# Patient Record
Sex: FEMALE | Race: WHITE | NOT HISPANIC OR LATINO | Employment: FULL TIME | URBAN - METROPOLITAN AREA
[De-identification: names, ages, dates, MRNs, and addresses within clinical notes are randomized per-mention and may not be internally consistent; named-entity substitution may affect disease eponyms.]

---

## 2018-01-18 ENCOUNTER — CONSULT (OUTPATIENT)
Dept: CARDIOLOGY | Facility: CLINIC | Age: 58
End: 2018-01-18

## 2018-01-18 VITALS
WEIGHT: 138 LBS | HEART RATE: 100 BPM | DIASTOLIC BLOOD PRESSURE: 87 MMHG | HEIGHT: 60 IN | SYSTOLIC BLOOD PRESSURE: 137 MMHG | BODY MASS INDEX: 27.09 KG/M2

## 2018-01-18 DIAGNOSIS — R06.02 SOB (SHORTNESS OF BREATH): ICD-10-CM

## 2018-01-18 DIAGNOSIS — R82.90 FOUL SMELLING URINE: Primary | ICD-10-CM

## 2018-01-18 DIAGNOSIS — R00.2 PALPITATIONS: ICD-10-CM

## 2018-01-18 DIAGNOSIS — R07.9 CHEST PAIN, UNSPECIFIED TYPE: ICD-10-CM

## 2018-01-18 PROCEDURE — 93000 ELECTROCARDIOGRAM COMPLETE: CPT | Performed by: INTERNAL MEDICINE

## 2018-01-18 PROCEDURE — 99204 OFFICE O/P NEW MOD 45 MIN: CPT | Performed by: INTERNAL MEDICINE

## 2018-01-18 RX ORDER — LISINOPRIL 10 MG/1
10 TABLET ORAL DAILY
Qty: 30 TABLET | Refills: 11 | Status: SHIPPED | OUTPATIENT
Start: 2018-01-18 | End: 2018-02-15 | Stop reason: SDUPTHER

## 2018-01-18 RX ORDER — OMEPRAZOLE 40 MG/1
20 CAPSULE, DELAYED RELEASE ORAL DAILY
COMMUNITY

## 2018-01-18 RX ORDER — AMLODIPINE BESYLATE 2.5 MG/1
2.5 TABLET ORAL DAILY
Qty: 30 TABLET | Refills: 11 | Status: SHIPPED | OUTPATIENT
Start: 2018-01-18 | End: 2018-02-15 | Stop reason: SDUPTHER

## 2018-01-18 NOTE — PROGRESS NOTES
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    HEALTHCARE PROVIDER: Roopa Lane PA-C  GASTROENTEROLOGIST: unknown, in [place]    Chief Complaint:   Chief Complaint   Patient presents with   • Chest Pain   • Shortness of Breath   • Dizziness   • Irregular Heart Beat   • Hypertension   • Rapid Heart Rate     Problem List:  1. Possible hypertensive cardiovascular disease/palpitations:  A. Remote heart monitor in her 20s for PVCs-data deficit  B. CCS class II-III chest discomfort/NYHA class II exertional dyspnea with normal EKG  2. Hypertension, probably essential  3. Former tobacco abuse in her teens  4. GERD/gastritis with EGD in fall 2017 with benign polyps  5. Foul-smelling urine - possible recurrent urinary tract infection, January 2018  6. Surgical history: Hysterectomy    No Known Allergies      Current Outpatient Prescriptions:   •  Famotidine-Ca Carb-Mag Hydrox (ACID REDUCER + ANTACID PO), Take  by mouth Daily., Disp: , Rfl:   •  omeprazole (priLOSEC) 40 MG capsule, Take 40 mg by mouth Daily., Disp: , Rfl:     History of Present Illness  This is a 57-year-old white female who presents today for elevated blood pressure, tachycardia, chest pain, and palpitations.  She states that her blood pressure has been elevated lately and has been running between 150 and 170 systolic.  She had an episode where she went to [facility] on 12/14/17 for blurred vision, chest tightness, and palpitations.  Apparently, while in the ED, she had heart rates up to 130 bpm, but her ECG was normal, and her troponins were negative.  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  She has gastritis and occasionally has right upper back pain.  In the fall of 2017, she had a workup because she thought that she was having problems with her gallbladder due to her back pain.  She had an EGD and ultrasound of her gallbladder which were negative by report; data deficit.  She has known polyps and gastritis, but all biopsies were negative; data deficit.  Sometimes she will have some weakness in her arms when her blood pressure is elevated.  She has been taking her 's lisinopril 10 mg daily, as well as using her father's Ativan to help control her heart rate and blood pressure.  When she was in her 20s, she had some PVCs and wore a heart monitor.  She denies any CVAs, TIAs, seizures, DVTs, PEs, hyperlipidemia, thyroid dysfunction, PIH, preeclampsia, gestational diabetes, or type 2 diabetes mellitus.  She feels that overall she is a very healthy person and rarely has to go to the doctor.  She has noticed that her urine has a foul smell and she had a urinary tract infection in mid-December 2017.  Last week, she went to have a urine specimen tested, and it was contaminated.  She is still experiencing the foul-smelling urine but denies any dysuria or fevers.  She smoked briefly in her teens.  She would not like to have a stress test because it gives her anxiety because she knows of a couple of people who had heart attacks while they were having stress tests.  She has never had an MI, echocardiogram, or stress test.  She has no family history of CAD.  She denies any chest pressure but sometimes feels tightness when her blood pressure and heart rate are elevated.  Associated symptoms are shortness of breath, palpitations, visual disturbances, occasional headaches, arm weakness, and occasional right upper back pain.  She is accompanied to the office today by an RN sister.    Cardiovascular Disease Risk  Factors  hyptertension    Social History     Social History   • Marital status:      Spouse name: N/A   • Number of children: 2   • Years of education: N/A     Occupational History   • Not on file.     Social History Main Topics   • Smoking status: Never Smoker   • Smokeless tobacco: Never Used   • Alcohol use No   • Drug use: No   • Sexual activity: Defer     Other Topics Concern   • Not on file     Social History Narrative   • No narrative on file       Family History   Problem Relation Age of Onset   • No Known Problems Mother    • Cancer Father    • Dementia Father    • No Known Problems Brother        Review of Systems   Constitution: Positive for malaise/fatigue.   Eyes: Positive for blurred vision.   Cardiovascular: Positive for chest pain, dyspnea on exertion, irregular heartbeat and palpitations. Negative for claudication, cyanosis, leg swelling, near-syncope, orthopnea, paroxysmal nocturnal dyspnea and syncope.   Respiratory: Positive for sleep disturbances due to breathing.    Endocrine: Positive for heat intolerance.   Hematologic/Lymphatic: Negative.    Skin: Positive for dry skin.   Musculoskeletal: Positive for back pain, neck pain and stiffness.   Gastrointestinal: Positive for heartburn. Negative for change in bowel habit.        Has gastritis   Genitourinary:        Foul-smelling urine   Neurological: Positive for dizziness and headaches.   Psychiatric/Behavioral: The patient is nervous/anxious.    Allergic/Immunologic: Negative.       Obtained and negative except as outlined in problem list and HPI.      ECG 12 Lead  Date/Time: 1/18/2018 10:37 AM  Performed by: CHAVO NIELSON  Authorized by: CHAVO NIELSON   Rhythm comments: Normal sinus rhythm with sinus arrhythmia, normal ECG, 99 bpm, QRS 82 ms,  ms,  ms                 Objective:       Vitals:    01/18/18 0924 01/18/18 0928 01/18/18 0929 01/18/18 0930   BP: 129/85 138/89 141/89 137/87   BP Location: Right arm Right arm  "Left arm Left arm   Patient Position: Sitting Standing Standing Sitting   Pulse: 104 119 115 100   Weight: 62.6 kg (138 lb)      Height: 152.4 cm (60\")      Recheck blood pressure right arm sitting was 124/84, heart rate 84  Body mass index is 26.95 kg/(m^2).     Physical Exam   Constitutional: She is oriented to person, place, and time. She appears well-developed and well-nourished.   HENT:   Mouth/Throat: Oropharynx is clear and moist and mucous membranes are normal. She does not have dentures. No oral lesions. Normal dentition. No dental abscesses, uvula swelling, lacerations or dental caries.   Eyes:   Fundoscopic exam:       The right eye shows no AV nicking, no exudate and no hemorrhage.        The left eye shows no AV nicking, no exudate and no hemorrhage.   Neck: No JVD present. Carotid bruit is not present. No thyromegaly present.   Cardiovascular: Regular rhythm, S1 normal, S2 normal and normal heart sounds.  Exam reveals no gallop, no S3 and no friction rub.    No murmur heard.  Pulses:       Dorsalis pedis pulses are 1+ on the right side, and 1+ on the left side.        Posterior tibial pulses are 1+ on the right side, and 1+ on the left side.   Pulmonary/Chest: Effort normal and breath sounds normal. She has no wheezes. She has no rhonchi. She has no rales.   Abdominal: Soft. She exhibits no mass. There is no hepatosplenomegaly. There is no tenderness. There is no guarding.   Bowel sounds audible x4   Musculoskeletal: Normal range of motion. She exhibits no edema.   Lymphadenopathy:     She has no cervical adenopathy.   Neurological: She is alert and oriented to person, place, and time.   Skin: Skin is warm, dry and intact. No rash noted.   Vitals reviewed.      Lab Review:   12/14/17:  · CMP: Sodium 141, potassium 4.1, chloride 104, carbon dioxide 27, glucose 100, BUNs 11, creatinine 0.8, protein 8, albumin 4.1, calcium 10.1, bilirubin 0.4, AST 27, ALTs 26, alkaline phosphatase 92  · Troponins less " than 0.03, less than 0.02  · TSH 1.45  · D-dimer 0.42  · CBC: WBC 7.8, RBC 4.6, hemoglobin 12.9, hematocrit 40.5, MCV 87, MCH 20, MCHC 32, RDW 13.6, platelets 147, neutrophils 59, lymphocytes 22, monocytes 7, eos 1, basos 0      Assessment:   Patient with new onset hypertension, atypical and typical chest pain, palpitations, visual disturbances, and shortness of breath; CCS class II-III chest discomfort/NYHA class II dyspnea.  We will order a CT cardiac calcium score to determine if there is any calcification in the coronary arteries suggestive of coronary artery atherosclerosis.  We will also order an echocardiogram and echo stress test to risk stratify her for focal coronary artery disease.  She will wear a ZioXT to assess her palpitations and rule out any arrhythmias, PAF, or pauses.  She has had uncontrolled hypertension, and we will add lisinopril 10 mg daily and amlodipine 2.5 mg nightly.  She has foul-smelling urine so we will also order a urinalysis.     Diagnosis Plan   1. Foul smelling urine  Urinalysis - Urine, Clean Catch   2. SOB (shortness of breath)  Adult Stress Echo W/ Cont or Stress Agent if Necessary Per Protocol    Holter Monitor - 72 Hour Up To 21 Days    CT cardiac calcium score wo dye   3. Chest pain, unspecified type  Adult Stress Echo W/ Cont or Stress Agent if Necessary Per Protocol    Holter Monitor - 72 Hour Up To 21 Days    CT cardiac calcium score wo dye   4. Palpitations  Holter Monitor - 72 Hour Up To 21 Days    CT cardiac calcium score wo dye          Plan:       1. Patient to continue current medications and close follow up with the above providers.  2. Tentative cardiology follow up in 4 weeks or patient may return sooner PRN.  3. CT cardiac calcium score  4. Echocardiogram  5. ZioXT  6. Lisinopril 10 mg daily  7. Amlodipine 2.5 mg nightly  8. UA  9. Symptom-limited echocardiographic stress test with continuous oximetry  10. 1 800 card provided      Scribed for Geovanny Odell MD  by Lilli Manzano, OSWALD. 1/18/2018  10:43 AM    I, Geovanny Odell MD, FACC, personally performed the services described in this documentation as scribed by the above named individual in my presence, and it is both accurate and complete. At 10:57 AM on 01/18/2018

## 2018-02-05 ENCOUNTER — HOSPITAL ENCOUNTER (OUTPATIENT)
Dept: CARDIOLOGY | Facility: HOSPITAL | Age: 58
Discharge: HOME | End: 2018-02-05
Attending: INTERNAL MEDICINE | Admitting: INTERNAL MEDICINE

## 2018-02-05 VITALS
BODY MASS INDEX: 27.09 KG/M2 | HEART RATE: 106 BPM | DIASTOLIC BLOOD PRESSURE: 90 MMHG | WEIGHT: 138 LBS | SYSTOLIC BLOOD PRESSURE: 138 MMHG | HEIGHT: 60 IN

## 2018-02-05 DIAGNOSIS — R06.02 SOB (SHORTNESS OF BREATH): ICD-10-CM

## 2018-02-05 DIAGNOSIS — R07.9 CHEST PAIN, UNSPECIFIED TYPE: ICD-10-CM

## 2018-02-05 DIAGNOSIS — R00.2 PALPITATIONS: ICD-10-CM

## 2018-02-05 LAB
BH CV ECHO MEAS - AO ROOT AREA (BSA CORRECTED): 1.4
BH CV ECHO MEAS - AO ROOT AREA: 3.8 CM^2
BH CV ECHO MEAS - AO ROOT DIAM: 2.2 CM
BH CV ECHO MEAS - BSA(HAYCOCK): 1.6 M^2
BH CV ECHO MEAS - BSA: 1.6 M^2
BH CV ECHO MEAS - BZI_BMI: 26.2 KILOGRAMS/M^2
BH CV ECHO MEAS - BZI_METRIC_HEIGHT: 152.4 CM
BH CV ECHO MEAS - BZI_METRIC_WEIGHT: 60.8 KG
BH CV ECHO MEAS - CONTRAST EF (2CH): 65 ML/M^2
BH CV ECHO MEAS - CONTRAST EF 4CH: 77.4 ML/M^2
BH CV ECHO MEAS - EDV(CUBED): 40.4 ML
BH CV ECHO MEAS - EDV(MOD-SP2): 20 ML
BH CV ECHO MEAS - EDV(MOD-SP4): 31 ML
BH CV ECHO MEAS - EDV(TEICH): 48.5 ML
BH CV ECHO MEAS - EF(CUBED): 79.6 %
BH CV ECHO MEAS - EF(MOD-SP2): 65 %
BH CV ECHO MEAS - EF(MOD-SP4): 77.4 %
BH CV ECHO MEAS - EF(TEICH): 73 %
BH CV ECHO MEAS - ESV(CUBED): 8.2 ML
BH CV ECHO MEAS - ESV(MOD-SP2): 7 ML
BH CV ECHO MEAS - ESV(MOD-SP4): 7 ML
BH CV ECHO MEAS - ESV(TEICH): 13.1 ML
BH CV ECHO MEAS - FS: 41.1 %
BH CV ECHO MEAS - IVS/LVPW: 0.92
BH CV ECHO MEAS - IVSD: 0.9 CM
BH CV ECHO MEAS - LA DIMENSION: 2.9 CM
BH CV ECHO MEAS - LA/AO: 1.3
BH CV ECHO MEAS - LAT PEAK E' VEL: 11.3 CM/SEC
BH CV ECHO MEAS - LV DIASTOLIC VOL/BSA (35-75): 19.7 ML/M^2
BH CV ECHO MEAS - LV MASS(C)D: 88.5 GRAMS
BH CV ECHO MEAS - LV MASS(C)DI: 56.2 GRAMS/M^2
BH CV ECHO MEAS - LV SYSTOLIC VOL/BSA (12-30): 4.4 ML/M^2
BH CV ECHO MEAS - LVIDD: 3.4 CM
BH CV ECHO MEAS - LVIDS: 2 CM
BH CV ECHO MEAS - LVLD AP2: 5.8 CM
BH CV ECHO MEAS - LVLD AP4: 5.9 CM
BH CV ECHO MEAS - LVLS AP2: 4.6 CM
BH CV ECHO MEAS - LVLS AP4: 4.5 CM
BH CV ECHO MEAS - LVPWD: 1 CM
BH CV ECHO MEAS - MED PEAK E' VEL: 8.1 CM/SEC
BH CV ECHO MEAS - MV A MAX VEL: 109.1 CM/SEC
BH CV ECHO MEAS - MV E MAX VEL: 73.5 CM/SEC
BH CV ECHO MEAS - MV E/A: 0.67
BH CV ECHO MEAS - PA ACC SLOPE: 806 CM/SEC^2
BH CV ECHO MEAS - PA ACC TIME: 0.13 SEC
BH CV ECHO MEAS - PA PR(ACCEL): 22 MMHG
BH CV ECHO MEAS - SI(CUBED): 20.4 ML/M^2
BH CV ECHO MEAS - SI(MOD-SP2): 8.3 ML/M^2
BH CV ECHO MEAS - SI(MOD-SP4): 15.2 ML/M^2
BH CV ECHO MEAS - SI(TEICH): 22.5 ML/M^2
BH CV ECHO MEAS - SV(CUBED): 32.1 ML
BH CV ECHO MEAS - SV(MOD-SP2): 13 ML
BH CV ECHO MEAS - SV(MOD-SP4): 24 ML
BH CV ECHO MEAS - SV(TEICH): 35.4 ML
BH CV ECHO MEAS - TAPSE (>1.6): 1.5 CM2
BH CV STRESS BP STAGE 1: NORMAL
BH CV STRESS BP STAGE 2: NORMAL
BH CV STRESS DURATION MIN STAGE 1: 3
BH CV STRESS DURATION MIN STAGE 2: 3
BH CV STRESS DURATION MIN STAGE 3: 1
BH CV STRESS DURATION SEC STAGE 1: 0
BH CV STRESS DURATION SEC STAGE 2: 0
BH CV STRESS DURATION SEC STAGE 3: 10
BH CV STRESS ECHO POST STRESS EJECTION FRACTION EF: 75 %
BH CV STRESS GRADE STAGE 1: 10
BH CV STRESS GRADE STAGE 2: 12
BH CV STRESS GRADE STAGE 3: 14
BH CV STRESS HR STAGE 1: 160
BH CV STRESS HR STAGE 2: 181
BH CV STRESS HR STAGE 3: 187
BH CV STRESS METS STAGE 1: 5
BH CV STRESS METS STAGE 2: 7.5
BH CV STRESS METS STAGE 3: 10
BH CV STRESS O2 STAGE 1: 99
BH CV STRESS O2 STAGE 2: 99
BH CV STRESS PROTOCOL 1: NORMAL
BH CV STRESS RECOVERY BP: NORMAL MMHG
BH CV STRESS RECOVERY HR: 125 BPM
BH CV STRESS SPEED STAGE 1: 1.7
BH CV STRESS SPEED STAGE 2: 2.5
BH CV STRESS SPEED STAGE 3: 3.4
BH CV STRESS STAGE 1: 1
BH CV STRESS STAGE 2: 2
BH CV STRESS STAGE 3: 3
BH CV VAS BP RIGHT ARM: NORMAL MMHG
BH CV XLRA - RV BASE: 2.5 CM
BH CV XLRA - RV LENGTH: 5.6 CM
BH CV XLRA - RV MID: 2 CM
BH CV XLRA - TDI S': 16.3 CM/SEC
LV EF 2D ECHO EST: 68 %
MAXIMAL PREDICTED HEART RATE: 162 BPM
PERCENT MAX PREDICTED HR: 115.43 %
STRESS BASELINE BP: NORMAL MMHG
STRESS BASELINE HR: 88 BPM
STRESS PERCENT HR: 136 %
STRESS POST ESTIMATED WORKLOAD: 10.1 METS
STRESS POST EXERCISE DUR MIN: 7 MIN
STRESS POST EXERCISE DUR SEC: 10 SEC
STRESS POST PEAK BP: NORMAL MMHG
STRESS POST PEAK HR: 187 BPM
STRESS TARGET HR: 138 BPM

## 2018-02-05 PROCEDURE — 93350 STRESS TTE ONLY: CPT | Performed by: INTERNAL MEDICINE

## 2018-02-05 PROCEDURE — 25010000002 SULFUR HEXAFLUORIDE MICROSPH 60.7-25 MG RECONSTITUTED SUSPENSION: Performed by: INTERNAL MEDICINE

## 2018-02-05 PROCEDURE — 93325 DOPPLER ECHO COLOR FLOW MAPG: CPT

## 2018-02-05 PROCEDURE — 93320 DOPPLER ECHO COMPLETE: CPT | Performed by: INTERNAL MEDICINE

## 2018-02-05 PROCEDURE — 93018 CV STRESS TEST I&R ONLY: CPT | Performed by: INTERNAL MEDICINE

## 2018-02-05 PROCEDURE — 93352 ADMIN ECG CONTRAST AGENT: CPT | Performed by: INTERNAL MEDICINE

## 2018-02-05 PROCEDURE — 93017 CV STRESS TEST TRACING ONLY: CPT

## 2018-02-05 PROCEDURE — 93350 STRESS TTE ONLY: CPT

## 2018-02-05 PROCEDURE — 93325 DOPPLER ECHO COLOR FLOW MAPG: CPT | Performed by: INTERNAL MEDICINE

## 2018-02-05 PROCEDURE — 93320 DOPPLER ECHO COMPLETE: CPT

## 2018-02-05 RX ADMIN — SULFUR HEXAFLUORIDE 2 ML: KIT at 09:00

## 2018-02-15 ENCOUNTER — OFFICE VISIT (OUTPATIENT)
Dept: CARDIOLOGY | Facility: CLINIC | Age: 58
End: 2018-02-15

## 2018-02-15 VITALS
BODY MASS INDEX: 27.29 KG/M2 | SYSTOLIC BLOOD PRESSURE: 121 MMHG | DIASTOLIC BLOOD PRESSURE: 94 MMHG | HEART RATE: 98 BPM | WEIGHT: 139 LBS | HEIGHT: 60 IN

## 2018-02-15 DIAGNOSIS — I10 ESSENTIAL HYPERTENSION: ICD-10-CM

## 2018-02-15 DIAGNOSIS — K21.9 GASTROESOPHAGEAL REFLUX DISEASE WITHOUT ESOPHAGITIS: ICD-10-CM

## 2018-02-15 DIAGNOSIS — R00.2 INTERMITTENT PALPITATIONS: ICD-10-CM

## 2018-02-15 DIAGNOSIS — I11.9 HYPERTENSIVE HEART DISEASE WITHOUT HEART FAILURE: Primary | ICD-10-CM

## 2018-02-15 PROCEDURE — 99214 OFFICE O/P EST MOD 30 MIN: CPT | Performed by: INTERNAL MEDICINE

## 2018-02-15 RX ORDER — LISINOPRIL 10 MG/1
10 TABLET ORAL DAILY
Qty: 30 TABLET | Refills: 11 | Status: SHIPPED | OUTPATIENT
Start: 2018-02-15

## 2018-02-15 RX ORDER — AMLODIPINE BESYLATE 2.5 MG/1
2.5 TABLET ORAL DAILY
Qty: 30 TABLET | Refills: 11 | Status: SHIPPED | OUTPATIENT
Start: 2018-02-15

## 2018-02-15 NOTE — PROGRESS NOTES
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     HEALTHCARE PROVIDER: Roopa Lane PA-C  GASTROENTEROLOGIST: unknown, in [place].    Chief Complaint:   Chief Complaint   Patient presents with   • Shortness of Breath   • Dizziness   • Hypertension     Problem List:  1. Possible hypertensive cardiovascular disease/palpitations:  A. Remote heart monitor in her 20s for PVCs-data deficit  B. CCS class II-III chest discomfort/NYHA class II exertional dyspnea with normal EKG  C. Echo GXT 1/18/18: Patient had complaints of mild chest tightness in the second stage but did not intensify with continued exercise, exercised for 7 minutes and 5 seconds, DYAN equals 0, THR of 137 bpm achieved upon standing on treadmill then heart rate decreased to 125 bpm, then started to walk and achieved again at 2 minutes and 50 seconds.  No significant ST or T wave changes, no ectopy and no oximetry readings.  LVEF 0.68, LV diastolic dysfunction grade 1 consistent with impaired relaxation, no pericardial effusion, no pulmonary hypertension, normal RV cavity size, wall thickness, systolic function and septal motion noted, no structural valvular abnormality demonstrated.  Low probability for significant focal obstructive coronary artery disease following exercise to 115% predicted maximum heart rate and 100% predicted excess capacity with preserved normal LV function  D. Residual class I symptoms with ZIO/XT in progress and cardiac CT calcium score deferred due to lack of insurance coverage, February 2018  2. Hypertension, probably essential  3. Former tobacco abuse in her teens  4. GERD/gastritis with EGD in fall 2017 with benign polyps  5. Foul-smelling urine - possible recurrent urinary tract infection, January 2018  6. Surgical history: Hysterectomy    No Known Allergies      Current Outpatient Prescriptions:  "  •  amLODIPine (NORVASC) 2.5 MG tablet, Take 1 tablet by mouth Daily., Disp: 30 tablet, Rfl: 11  •  lisinopril (PRINIVIL,ZESTRIL) 10 MG tablet, Take 1 tablet by mouth Daily., Disp: 30 tablet, Rfl: 11  •  Multiple Vitamin (MULTI VITAMIN PO), Take  by mouth Daily., Disp: , Rfl:   •  omeprazole (priLOSEC) 40 MG capsule, Take 20 mg by mouth Daily., Disp: , Rfl:     HISTORY OF PRESENT ILLNESS:  Patient is here for a 4 week follow-up.  Last visit she was having some atypical chest pain and new onset hypertension.  We requested a CT cardiac calcium score, but this was not performed because her insurance did not cover it.  She had an echo GXT which was acceptable.  She is currently wearing her ZioXT monitor, and she has only noticed two times when she was having tachycardia.  She states that one time, she was just sitting down doing her work, and the other time it woke her up from sleep.  She had a Coke the day before.  She has tried to limit her caffeine consumption, as she notices that this exacerbates her tachycardia.  She has not had any chest pressure or shortness of breath.  She has not had any dizziness or presyncope.  She needs refills on her lisinopril and her amlodipine.  She is asymptomatic and active and wishes to restart her gym exercise program.  Patient otherwise denies chest pain, shortness of breath, PND, edema, palpitations, syncope or presyncope at this time.      Review of Systems   Cardiovascular: Positive for dyspnea on exertion, orthopnea and palpitations.      Obtained and otherwise negative except as outlined in problem list and HPI.    Procedures       Objective:       Vitals:    02/15/18 1339 02/15/18 1342   BP: 117/82 121/94   BP Location: Left arm Left arm   Patient Position: Sitting Standing   Pulse: 88 98   Weight: 63 kg (139 lb)    Height: 152.4 cm (60\")      Body mass index is 27.15 kg/(m^2).  Last weight January 2018 was 138 pounds    Physical Exam   Constitutional: She is oriented to " person, place, and time. She appears well-developed and well-nourished.   Neck: No JVD present. Carotid bruit is not present. No thyromegaly present.   Cardiovascular: Regular rhythm, S1 normal, S2 normal and normal heart sounds.  Exam reveals no gallop, no S3 and no friction rub.    No murmur heard.  Pulses:       Carotid pulses are 2+ on the right side, and 2+ on the left side.       Radial pulses are 2+ on the right side, and 2+ on the left side.        Femoral pulses are 2+ on the right side, and 2+ on the left side.       Popliteal pulses are 2+ on the right side, and 2+ on the left side.        Dorsalis pedis pulses are 2+ on the right side, and 2+ on the left side.        Posterior tibial pulses are 2+ on the right side, and 2+ on the left side.   Pulmonary/Chest: Effort normal and breath sounds normal. She has no wheezes. She has no rhonchi. She has no rales.   Abdominal: Soft. She exhibits no mass. There is no hepatosplenomegaly. There is no tenderness. There is no guarding.   Bowel sounds audible x4   Musculoskeletal: Normal range of motion. She exhibits no edema.   Lymphadenopathy:     She has no cervical adenopathy.   Neurological: She is alert and oriented to person, place, and time.   Skin: Skin is warm, dry and intact. No rash noted.   Vitals reviewed.        Lab Review:   12/14/17: (reviewed in office today)  · CMP: Sodium 141, potassium 4.1, chloride 104, carbon dioxide 27, glucose 100, BUNs 11, creatinine 0.8, protein 8, albumin 4.1, calcium 10.1, bilirubin 0.4, AST 27, ALTs 26, alkaline phosphatase 92  · Troponins less than 0.03, less than 0.02  · TSH 1.45  · D-dimer 0.42  · CBC: WBC 7.8, RBC 4.6, hemoglobin 12.9, hematocrit 40.5, MCV 87, MCH 20, MCHC 32, RDW 13.6, platelets 147, neutrophils 59, lymphocytes 22, monocytes 7, eos 1, basos 0         Assessment:   Overall continued acceptable course with no interim cardiopulmonary complaints with acceptable functional status. We will defer additional  diagnostic or therapeutic intervention from a cardiac perspective at this time.  Her echo GXT last month was acceptable. We will review her ZioXT when the results are available.  She has had only 2 episodes of palpitations since wearing the monitor.     Diagnosis Plan   1. Hypertensive heart disease without heart failure  Stable with acceptable echo GXT January 2018   2. Essential hypertension  Controlled   3. Gastroesophageal reflux disease without esophagitis  Stable   4.      Intermittent palpitations                                            Review ZioXT results when available       Plan:         1. Patient to continue current medications and close follow up with the above providers.  2. Tentative cardiology follow up in June-July 2018 or patient may return sooner PRN.  3. Refill amlodipine and lisinopril  4. Review ZioXT when results available  5. 1-800 card provided    Scribed for Geovanny Odell MD by OSWALD Dow. 2/15/2018  1:44 PM    I, Geovanny Odell MD, [facility], personally performed the services described in this documentation as scribed by the above named individual in my presence, and it is both accurate and complete. At 2:16 PM on 02/15/2018

## 2019-03-04 RX ORDER — AMLODIPINE BESYLATE 2.5 MG/1
TABLET ORAL
Qty: 30 TABLET | Refills: 11 | OUTPATIENT
Start: 2019-03-04

## 2019-03-18 RX ORDER — LISINOPRIL 10 MG/1
TABLET ORAL
Qty: 30 TABLET | Refills: 11 | OUTPATIENT
Start: 2019-03-18

## 2019-03-21 RX ORDER — AMLODIPINE BESYLATE 2.5 MG/1
TABLET ORAL
Qty: 30 TABLET | Refills: 11 | OUTPATIENT
Start: 2019-03-21

## 2019-03-21 RX ORDER — LISINOPRIL 10 MG/1
TABLET ORAL
Qty: 30 TABLET | Refills: 11 | OUTPATIENT
Start: 2019-03-21

## 2022-05-04 ENCOUNTER — TELEPHONE (OUTPATIENT)
Dept: FAMILY MEDICINE CLINIC | Facility: CLINIC | Age: 62
End: 2022-05-04

## 2022-05-04 DIAGNOSIS — I10 ESSENTIAL HYPERTENSION: Primary | ICD-10-CM

## 2022-05-04 DIAGNOSIS — E53.8 VITAMIN B12 DEFICIENCY: ICD-10-CM

## 2022-05-04 DIAGNOSIS — Z13.220 LIPID SCREENING: ICD-10-CM

## 2022-05-04 NOTE — TELEPHONE ENCOUNTER
Patient would like to come in to have labs drawn and would like to have and order placed .    The patient can be reached at [phone] if they have any questions or concerns.    Please advise.

## 2022-05-17 ENCOUNTER — LAB (OUTPATIENT)
Dept: FAMILY MEDICINE CLINIC | Facility: CLINIC | Age: 62
End: 2022-05-17

## 2022-05-17 DIAGNOSIS — Z13.220 LIPID SCREENING: ICD-10-CM

## 2022-05-17 DIAGNOSIS — I10 ESSENTIAL HYPERTENSION: ICD-10-CM

## 2022-05-17 DIAGNOSIS — E53.8 VITAMIN B12 DEFICIENCY: ICD-10-CM

## 2022-05-17 PROCEDURE — 36415 COLL VENOUS BLD VENIPUNCTURE: CPT | Performed by: PHYSICIAN ASSISTANT

## 2022-05-18 LAB
ALBUMIN SERPL-MCNC: 4.2 G/DL (ref 3.8–4.8)
ALBUMIN/GLOB SERPL: 1.4 {RATIO} (ref 1.2–2.2)
ALP SERPL-CCNC: 99 IU/L (ref 44–121)
ALT SERPL-CCNC: 14 IU/L (ref 0–32)
AST SERPL-CCNC: 19 IU/L (ref 0–40)
BASOPHILS # BLD AUTO: 0 X10E3/UL (ref 0–0.2)
BASOPHILS NFR BLD AUTO: 1 %
BILIRUB SERPL-MCNC: 0.2 MG/DL (ref 0–1.2)
BUN SERPL-MCNC: 14 MG/DL (ref 8–27)
BUN/CREAT SERPL: 16 (ref 12–28)
CALCIUM SERPL-MCNC: 9.6 MG/DL (ref 8.7–10.3)
CHLORIDE SERPL-SCNC: 105 MMOL/L (ref 96–106)
CHOLEST SERPL-MCNC: 243 MG/DL (ref 100–199)
CO2 SERPL-SCNC: 19 MMOL/L (ref 20–29)
CREAT SERPL-MCNC: 0.85 MG/DL (ref 0.57–1)
EGFRCR SERPLBLD CKD-EPI 2021: 77 ML/MIN/1.73
EOSINOPHIL # BLD AUTO: 0.1 X10E3/UL (ref 0–0.4)
EOSINOPHIL NFR BLD AUTO: 2 %
ERYTHROCYTE [DISTWIDTH] IN BLOOD BY AUTOMATED COUNT: 14 % (ref 11.7–15.4)
GLOBULIN SER CALC-MCNC: 3 G/DL (ref 1.5–4.5)
GLUCOSE SERPL-MCNC: 93 MG/DL (ref 65–99)
HCT VFR BLD AUTO: 38.9 % (ref 34–46.6)
HDLC SERPL-MCNC: 66 MG/DL
HGB BLD-MCNC: 12.4 G/DL (ref 11.1–15.9)
IMM GRANULOCYTES # BLD AUTO: 0 X10E3/UL (ref 0–0.1)
IMM GRANULOCYTES NFR BLD AUTO: 0 %
LDLC SERPL CALC-MCNC: 156 MG/DL (ref 0–99)
LYMPHOCYTES # BLD AUTO: 2.3 X10E3/UL (ref 0.7–3.1)
LYMPHOCYTES NFR BLD AUTO: 34 %
MCH RBC QN AUTO: 26.2 PG (ref 26.6–33)
MCHC RBC AUTO-ENTMCNC: 31.9 G/DL (ref 31.5–35.7)
MCV RBC AUTO: 82 FL (ref 79–97)
MONOCYTES # BLD AUTO: 0.6 X10E3/UL (ref 0.1–0.9)
MONOCYTES NFR BLD AUTO: 9 %
NEUTROPHILS # BLD AUTO: 3.6 X10E3/UL (ref 1.4–7)
NEUTROPHILS NFR BLD AUTO: 54 %
PLATELET # BLD AUTO: 441 X10E3/UL (ref 150–450)
POTASSIUM SERPL-SCNC: 5.2 MMOL/L (ref 3.5–5.2)
PROT SERPL-MCNC: 7.2 G/DL (ref 6–8.5)
RBC # BLD AUTO: 4.73 X10E6/UL (ref 3.77–5.28)
SODIUM SERPL-SCNC: 142 MMOL/L (ref 134–144)
TRIGL SERPL-MCNC: 118 MG/DL (ref 0–149)
TSH SERPL DL<=0.005 MIU/L-ACNC: 0.46 UIU/ML (ref 0.45–4.5)
VIT B12 SERPL-MCNC: 452 PG/ML (ref 232–1245)
VLDLC SERPL CALC-MCNC: 21 MG/DL (ref 5–40)
WBC # BLD AUTO: 6.7 X10E3/UL (ref 3.4–10.8)

## 2022-06-21 RX ORDER — AMLODIPINE BESYLATE 5 MG/1
TABLET ORAL
Qty: 30 TABLET | Refills: 0 | Status: SHIPPED | OUTPATIENT
Start: 2022-06-21

## 2024-09-23 ENCOUNTER — OFFICE VISIT (OUTPATIENT)
Dept: FAMILY MEDICINE CLINIC | Facility: CLINIC | Age: 64
End: 2024-09-23
Payer: MEDICARE

## 2024-09-23 VITALS
WEIGHT: 135 LBS | HEART RATE: 85 BPM | DIASTOLIC BLOOD PRESSURE: 70 MMHG | OXYGEN SATURATION: 98 % | BODY MASS INDEX: 26.5 KG/M2 | HEIGHT: 60 IN | SYSTOLIC BLOOD PRESSURE: 110 MMHG

## 2024-09-23 DIAGNOSIS — Z11.59 ENCOUNTER FOR HEPATITIS C SCREENING TEST FOR LOW RISK PATIENT: ICD-10-CM

## 2024-09-23 DIAGNOSIS — I10 ESSENTIAL HYPERTENSION: ICD-10-CM

## 2024-09-23 DIAGNOSIS — G89.29 CHRONIC RIGHT-SIDED THORACIC BACK PAIN: ICD-10-CM

## 2024-09-23 DIAGNOSIS — Z13.1 DIABETES MELLITUS SCREENING: ICD-10-CM

## 2024-09-23 DIAGNOSIS — M54.6 CHRONIC RIGHT-SIDED THORACIC BACK PAIN: ICD-10-CM

## 2024-09-23 DIAGNOSIS — M53.3 COCCYX PAIN: ICD-10-CM

## 2024-09-23 DIAGNOSIS — Z00.00 WELCOME TO MEDICARE PREVENTIVE VISIT: Primary | ICD-10-CM

## 2024-09-23 DIAGNOSIS — M54.2 CERVICALGIA: ICD-10-CM

## 2024-09-23 PROCEDURE — 3078F DIAST BP <80 MM HG: CPT | Performed by: PHYSICIAN ASSISTANT

## 2024-09-23 PROCEDURE — 3074F SYST BP LT 130 MM HG: CPT | Performed by: PHYSICIAN ASSISTANT

## 2024-09-23 PROCEDURE — G0402 INITIAL PREVENTIVE EXAM: HCPCS | Performed by: PHYSICIAN ASSISTANT

## 2024-09-23 RX ORDER — LISINOPRIL 10 MG/1
10 TABLET ORAL DAILY
Qty: 90 TABLET | Refills: 1 | Status: SHIPPED | OUTPATIENT
Start: 2024-09-23

## 2024-09-23 RX ORDER — OMEPRAZOLE 40 MG/1
20 CAPSULE, DELAYED RELEASE ORAL DAILY
Status: CANCELLED | OUTPATIENT
Start: 2024-09-23

## 2024-09-23 RX ORDER — AMLODIPINE BESYLATE 5 MG/1
TABLET ORAL
Qty: 90 TABLET | Refills: 1 | Status: SHIPPED | OUTPATIENT
Start: 2024-09-23

## 2024-09-24 DIAGNOSIS — R79.89 LOW TSH LEVEL: Primary | ICD-10-CM

## 2024-09-24 LAB
ALBUMIN SERPL-MCNC: 4.3 G/DL (ref 3.9–4.9)
ALP SERPL-CCNC: 103 IU/L (ref 44–121)
ALT SERPL-CCNC: 12 IU/L (ref 0–32)
AST SERPL-CCNC: 22 IU/L (ref 0–40)
BASOPHILS # BLD AUTO: 0 X10E3/UL (ref 0–0.2)
BASOPHILS NFR BLD AUTO: 0 %
BILIRUB SERPL-MCNC: 0.3 MG/DL (ref 0–1.2)
BUN SERPL-MCNC: 15 MG/DL (ref 8–27)
BUN/CREAT SERPL: 15 (ref 12–28)
CALCIUM SERPL-MCNC: 9.9 MG/DL (ref 8.7–10.3)
CHLORIDE SERPL-SCNC: 103 MMOL/L (ref 96–106)
CHOLEST SERPL-MCNC: 235 MG/DL (ref 100–199)
CO2 SERPL-SCNC: 22 MMOL/L (ref 20–29)
CREAT SERPL-MCNC: 1.01 MG/DL (ref 0.57–1)
EGFRCR SERPLBLD CKD-EPI 2021: 62 ML/MIN/1.73
EOSINOPHIL # BLD AUTO: 0.1 X10E3/UL (ref 0–0.4)
EOSINOPHIL NFR BLD AUTO: 2 %
ERYTHROCYTE [DISTWIDTH] IN BLOOD BY AUTOMATED COUNT: 13.3 % (ref 11.7–15.4)
GLOBULIN SER CALC-MCNC: 2.6 G/DL (ref 1.5–4.5)
GLUCOSE SERPL-MCNC: 88 MG/DL (ref 70–99)
HBA1C MFR BLD: 5.8 % (ref 4.8–5.6)
HCT VFR BLD AUTO: 41 % (ref 34–46.6)
HCV IGG SERPL QL IA: NON REACTIVE
HDLC SERPL-MCNC: 55 MG/DL
HGB BLD-MCNC: 13.3 G/DL (ref 11.1–15.9)
IMM GRANULOCYTES # BLD AUTO: 0 X10E3/UL (ref 0–0.1)
IMM GRANULOCYTES NFR BLD AUTO: 0 %
LDLC SERPL CALC-MCNC: 153 MG/DL (ref 0–99)
LYMPHOCYTES # BLD AUTO: 1.8 X10E3/UL (ref 0.7–3.1)
LYMPHOCYTES NFR BLD AUTO: 25 %
MCH RBC QN AUTO: 26.9 PG (ref 26.6–33)
MCHC RBC AUTO-ENTMCNC: 32.4 G/DL (ref 31.5–35.7)
MCV RBC AUTO: 83 FL (ref 79–97)
MONOCYTES # BLD AUTO: 0.6 X10E3/UL (ref 0.1–0.9)
MONOCYTES NFR BLD AUTO: 9 %
NEUTROPHILS # BLD AUTO: 4.5 X10E3/UL (ref 1.4–7)
NEUTROPHILS NFR BLD AUTO: 64 %
PLATELET # BLD AUTO: 402 X10E3/UL (ref 150–450)
POTASSIUM SERPL-SCNC: 4.4 MMOL/L (ref 3.5–5.2)
PROT SERPL-MCNC: 6.9 G/DL (ref 6–8.5)
RBC # BLD AUTO: 4.94 X10E6/UL (ref 3.77–5.28)
SODIUM SERPL-SCNC: 142 MMOL/L (ref 134–144)
TRIGL SERPL-MCNC: 153 MG/DL (ref 0–149)
TSH SERPL DL<=0.005 MIU/L-ACNC: 0.23 UIU/ML (ref 0.45–4.5)
VLDLC SERPL CALC-MCNC: 27 MG/DL (ref 5–40)
WBC # BLD AUTO: 7 X10E3/UL (ref 3.4–10.8)

## 2024-09-25 PROBLEM — Z00.00 WELCOME TO MEDICARE PREVENTIVE VISIT: Status: ACTIVE | Noted: 2024-09-25

## 2024-10-25 ENCOUNTER — LAB (OUTPATIENT)
Dept: FAMILY MEDICINE CLINIC | Facility: CLINIC | Age: 64
End: 2024-10-25
Payer: MEDICARE

## 2024-10-26 LAB — TSH SERPL DL<=0.005 MIU/L-ACNC: 0.51 UIU/ML (ref 0.45–4.5)

## 2024-10-28 ENCOUNTER — TELEPHONE (OUTPATIENT)
Dept: FAMILY MEDICINE CLINIC | Facility: CLINIC | Age: 64
End: 2024-10-28
Payer: COMMERCIAL

## 2024-10-28 NOTE — TELEPHONE ENCOUNTER
HUB TO RELAY: M for pt to call back.   ----- Message from Roopa Epps sent at 10/26/2024  7:24 PM EDT -----  Please let know TSH back in normal range; thanks

## 2025-03-19 RX ORDER — LISINOPRIL 10 MG/1
10 TABLET ORAL DAILY
Qty: 30 TABLET | Refills: 0 | Status: SHIPPED | OUTPATIENT
Start: 2025-03-19

## 2025-03-19 RX ORDER — AMLODIPINE BESYLATE 5 MG/1
TABLET ORAL
Qty: 30 TABLET | Refills: 0 | Status: SHIPPED | OUTPATIENT
Start: 2025-03-19

## 2025-03-19 NOTE — TELEPHONE ENCOUNTER
Caller: Priti Joseph FROILAN    Relationship: Self    Best call back number: [phone]     Requested Prescriptions:   Requested Prescriptions     Pending Prescriptions Disp Refills    lisinopril (PRINIVIL,ZESTRIL) 10 MG tablet 90 tablet 1     Sig: Take 1 tablet by mouth Daily.    amLODIPine (NORVASC) 5 MG tablet 90 tablet 1     Sig: Take one tablet by oral route once a day        Pharmacy where request should be sent: [address] [phone] [facility] [phone]      Last office visit with prescribing clinician: 9/23/2024   Last telemedicine visit with prescribing clinician: Visit date not found   Next office visit with prescribing clinician: Visit date not found     Additional details provided by patient: PATIENT HAS 1 WEEK LEFT. SHE IS OUT OF TOWN AND WILL NOT BE BACK UNTIL 04/15/25, SO WILL NEED ENOUGH  MEDICATION CALLED IN TO THE PHARMACY IN [place] TO LAST HER UNTIL SHE COMES HOME.     Does the patient have less than a 3 day supply:  [] Yes  [x] No    Would you like a call back once the refill request has been completed: [] Yes [x] No    If the office needs to give you a call back, can they leave a voicemail: [] Yes [x] No    Mariusz Holloway Rep   03/19/25 10:29 EDT

## 2025-04-16 ENCOUNTER — TELEPHONE (OUTPATIENT)
Dept: FAMILY MEDICINE CLINIC | Facility: CLINIC | Age: 65
End: 2025-04-16

## 2025-04-16 ENCOUNTER — OFFICE VISIT (OUTPATIENT)
Dept: FAMILY MEDICINE CLINIC | Facility: CLINIC | Age: 65
End: 2025-04-16
Payer: MEDICARE

## 2025-04-16 VITALS
OXYGEN SATURATION: 96 % | HEIGHT: 60 IN | HEART RATE: 77 BPM | BODY MASS INDEX: 27.48 KG/M2 | DIASTOLIC BLOOD PRESSURE: 68 MMHG | WEIGHT: 140 LBS | SYSTOLIC BLOOD PRESSURE: 110 MMHG

## 2025-04-16 DIAGNOSIS — M25.552 BILATERAL HIP PAIN: ICD-10-CM

## 2025-04-16 DIAGNOSIS — G89.29 CHRONIC BILATERAL LOW BACK PAIN WITHOUT SCIATICA: ICD-10-CM

## 2025-04-16 DIAGNOSIS — M25.551 BILATERAL HIP PAIN: ICD-10-CM

## 2025-04-16 DIAGNOSIS — M54.50 CHRONIC BILATERAL LOW BACK PAIN WITHOUT SCIATICA: ICD-10-CM

## 2025-04-16 DIAGNOSIS — I10 HYPERTENSION, ESSENTIAL: Primary | ICD-10-CM

## 2025-04-16 DIAGNOSIS — K21.9 GASTROESOPHAGEAL REFLUX DISEASE, UNSPECIFIED WHETHER ESOPHAGITIS PRESENT: ICD-10-CM

## 2025-04-16 PROCEDURE — 3074F SYST BP LT 130 MM HG: CPT | Performed by: PHYSICIAN ASSISTANT

## 2025-04-16 PROCEDURE — 3078F DIAST BP <80 MM HG: CPT | Performed by: PHYSICIAN ASSISTANT

## 2025-04-16 PROCEDURE — 99214 OFFICE O/P EST MOD 30 MIN: CPT | Performed by: PHYSICIAN ASSISTANT

## 2025-04-16 RX ORDER — AMLODIPINE BESYLATE 5 MG/1
TABLET ORAL
Qty: 90 TABLET | Refills: 1 | Status: SHIPPED | OUTPATIENT
Start: 2025-04-16

## 2025-04-16 RX ORDER — LISINOPRIL 10 MG/1
10 TABLET ORAL DAILY
Qty: 90 TABLET | Refills: 1 | Status: SHIPPED | OUTPATIENT
Start: 2025-04-16

## 2025-04-16 RX ORDER — OMEPRAZOLE 20 MG/1
20 CAPSULE, DELAYED RELEASE ORAL DAILY
Qty: 90 CAPSULE | Refills: 1 | Status: SHIPPED | OUTPATIENT
Start: 2025-04-16

## 2025-04-16 NOTE — TELEPHONE ENCOUNTER
LVM for Pt to let her know she is due for the shingles and pneumococcal vaccine. She is able to get that at the [facility] pharmacy. Advised if she had any questions to give us a call back

## 2025-04-16 NOTE — PROGRESS NOTES
"Chief Complaint  Heartburn (Pt is here for a refill on her heartburn medication ) and Hypertension    Subjective          Priti Joseph presents to [facility]  History of Present Illness  Patient in today for medication recheck and refill. States blood pressure has been doing well. Denies chest pain or shortness of breath. She would like to rtc for fasting labs.  was in [place] for several months so was not eating as healthy as her normal. She is due for colonoscopy, mammogram and DEXA scan-- declines all these today. She states for past several weeks has noticed pain to bilateral hip areas when lying down at nighttime. Does not bother her during day . She states did have to walk up/down a lot of steps where they were staying in [place] but did not bother her then. She states has some chronic lower back pain. Denies numbness or pain into legs. Denies changes to bowel or urine habits.   Heartburn  She complains of heartburn. She reports no abdominal pain, no chest pain, no coughing, no hoarse voice, no nausea or no sore throat. Pertinent negatives include no fatigue.   Hypertension  This is a chronic problem. Pertinent negatives include no blurred vision, chest pain, headaches, palpitations, peripheral edema or shortness of breath.       Objective   Vital Signs:   /68 (BP Location: Left arm, Patient Position: Sitting)   Pulse 77   Ht 152.4 cm (60\")   Wt 63.5 kg (140 lb)   SpO2 96%   BMI 27.34 kg/m²     Body mass index is 27.34 kg/m².    Review of Systems   Constitutional:  Negative for fatigue and fever.   HENT:  Negative for congestion, hoarse voice and sore throat.    Eyes:  Negative for blurred vision.   Respiratory:  Negative for cough and shortness of breath.    Cardiovascular:  Negative for chest pain and palpitations.   Gastrointestinal:  Positive for GERD. Negative for abdominal pain, blood in stool, diarrhea, nausea and vomiting.   Genitourinary:  Negative for " dysuria and frequency.   Neurological:  Negative for dizziness and headache.   Psychiatric/Behavioral:  Negative for depressed mood. The patient is not nervous/anxious.        Past History:  Medical History: has a past medical history of Clotting disorder, GERD (gastroesophageal reflux disease), Hypertension, Irritable bowel syndrome, and Menopause.   Surgical History: has a past surgical history that includes Hysterectomy.   Family History: family history includes COPD in her father; Cancer in her father; Dementia in her father; No Known Problems in her brother and mother.   Social History: reports that she has never smoked. She has never used smokeless tobacco. She reports that she does not drink alcohol and does not use drugs.      Current Outpatient Medications:     amLODIPine (NORVASC) 5 MG tablet, Take one tablet by oral route once a day, Disp: 90 tablet, Rfl: 1    lisinopril (PRINIVIL,ZESTRIL) 10 MG tablet, Take 1 tablet by mouth Daily., Disp: 90 tablet, Rfl: 1    Multiple Vitamin (MULTI VITAMIN PO), Take  by mouth Daily., Disp: , Rfl:     omeprazole (priLOSEC) 20 MG capsule, Take 1 capsule by mouth Daily., Disp: 90 capsule, Rfl: 1  Allergies: Patient has no known allergies.    Physical Exam  Constitutional:       Appearance: Normal appearance.   HENT:      Right Ear: Tympanic membrane normal.      Left Ear: Tympanic membrane normal.      Mouth/Throat:      Pharynx: Oropharynx is clear.   Eyes:      Conjunctiva/sclera: Conjunctivae normal.      Pupils: Pupils are equal, round, and reactive to light.   Cardiovascular:      Rate and Rhythm: Normal rate and regular rhythm.      Heart sounds: Normal heart sounds.   Pulmonary:      Effort: Pulmonary effort is normal.      Breath sounds: Normal breath sounds.   Abdominal:      Palpations: Abdomen is soft.      Tenderness: There is no abdominal tenderness.   Neurological:      Mental Status: She is oriented to person, place, and time.   Psychiatric:         Mood and  Affect: Mood normal.         Behavior: Behavior normal.             Assessment and Plan   Diagnoses and all orders for this visit:    1. Hypertension, essential (Primary)  Encouraged healthy diet and exercise; refilled current medication; rtc prior to recheck as needed; encouraged to get in for mammogram, colonoscopy and DEXA scan- she declines all of these today  2. Gastroesophageal reflux disease, unspecified whether esophagitis present  Refilled omeprazole.  3. Bilateral hip pain  -     XR Hips Bilateral With or Without Pelvis 2 View  Will check xrays of bilateral hips and lower back today for further evaluation   4. Chronic bilateral low back pain without sciatica  -     XR Spine Lumbar 2 or 3 View (In Office)    Other orders  -     lisinopril (PRINIVIL,ZESTRIL) 10 MG tablet; Take 1 tablet by mouth Daily.  Dispense: 90 tablet; Refill: 1  -     amLODIPine (NORVASC) 5 MG tablet; Take one tablet by oral route once a day  Dispense: 90 tablet; Refill: 1  -     omeprazole (priLOSEC) 20 MG capsule; Take 1 capsule by mouth Daily.  Dispense: 90 capsule; Refill: 1            Follow Up   No follow-ups on file.  Patient was given instructions and counseling regarding her condition or for health maintenance advice. Please see specific information pulled into the AVS if appropriate.     Roopa Epps PA-C

## 2025-04-22 ENCOUNTER — RESULTS FOLLOW-UP (OUTPATIENT)
Dept: FAMILY MEDICINE CLINIC | Facility: CLINIC | Age: 65
End: 2025-04-22
Payer: COMMERCIAL

## 2025-04-24 NOTE — TELEPHONE ENCOUNTER
LM on VM to call back.    HUB to relay message from [employer].      Please let know xrays showed milde to moderate chronic degenerative changes L2-L3 and mild lower lumbar facet arthropathy; hip joints and SI joints showed minimal degenerative spurring -- please see if would like us to put in a referral for consult with ortho- for further evaluation ; thanks

## 2025-04-25 NOTE — TELEPHONE ENCOUNTER
Name: Priti Joseph      Relationship: Self        HUB PROVIDED THE RELAY MESSAGE FROM THE OFFICE      PATIENT: VOICED UNDERSTANDING AND HAS NO FURTHER QUESTIONS AT THIS TIME    ADDITIONAL INFORMATION: